# Patient Record
Sex: FEMALE | URBAN - METROPOLITAN AREA
[De-identification: names, ages, dates, MRNs, and addresses within clinical notes are randomized per-mention and may not be internally consistent; named-entity substitution may affect disease eponyms.]

---

## 2021-07-03 ENCOUNTER — TELEPHONE (OUTPATIENT)
Dept: OBGYN CLINIC | Facility: CLINIC | Age: 61
End: 2021-07-03

## 2021-07-03 DIAGNOSIS — N76.0 ACUTE VAGINITIS: Primary | ICD-10-CM

## 2021-07-03 NOTE — TELEPHONE ENCOUNTER
Patient called called on-call line due to suspected yeast infection  Patient states that she has tried over-the-counter Monistat for 3-4 days so far with absolutely no improvement in symptoms  She is requesting vaginal terconazole cream   She reports thick white vaginal discharge with associated vaginal vulvar itching  She does have a history of diabetes  Given symptoms and history, we will treat presumptively with terconazole cream as requested  Patient instructed to call the office on Tuesday to schedule appointment for follow-up in office for exam if symptoms persist or fail to improve  Prescription electronically transmitted to patient's pharmacy of choice      Jackelyn Beal MD  7/3/2021 11:18 AM

## 2023-01-13 PROBLEM — Z01.419 ENCOUNTER FOR GYNECOLOGICAL EXAMINATION (GENERAL) (ROUTINE) WITHOUT ABNORMAL FINDINGS: Status: ACTIVE | Noted: 2023-01-13

## 2023-01-13 PROBLEM — Z90.710 HISTORY OF HYSTERECTOMY: Status: ACTIVE | Noted: 2023-01-13

## 2023-01-13 NOTE — PROGRESS NOTES
Assessment/Plan:    Encounter for gynecological examination (general) (routine) without abnormal findings  All well, no breast or gyn complaints  37 yo daughter just suffered  demise @31 weeks, family grieving  Normal breast and pelvic exams  Last pap 10/2020 neg/HPV neg, due   Mammo order given, last 10/19/2020  Colonoscopy 2007, was due ; referral and # given  Dexa @65, no risk factors       Diagnoses and all orders for this visit:    Encounter for gynecological examination (general) (routine) without abnormal findings    Encounter for screening mammogram for malignant neoplasm of breast  -     Mammo screening bilateral w 3d & cad; Future    Colon cancer screening  -     Ambulatory Referral to Gastroenterology; Future    Candidiasis  -     terconazole (TERAZOL 7) 0 4 % vaginal cream; Insert 1 applicator into the vagina daily at bedtime    Other orders  -     Liquid-based pap, diagnostic  -     FLUoxetine (PROzac) 20 mg capsule  -     Jardiance 25 MG TABS  -     losartan (COZAAR) 25 mg tablet  -     metFORMIN (GLUCOPHAGE) 1000 MG tablet  -     omeprazole (PriLOSEC) 10 mg delayed release capsule  -     pravastatin (PRAVACHOL) 20 mg tablet  -     predniSONE 20 mg tablet  -     Tradjenta 5 MG TABS  -     glimepiride (AMARYL) 4 mg tablet  -     benzonatate (TESSALON PERLES) 100 mg capsule; TAKE 1 CAPSULE BY MOUTH EVERY 8 HOURS AS NEEDED (COUGH)  Subjective:      Patient ID: Alvin Finley is a 58 y o  female  HPI Here for well check last seen 10/2020    The following portions of the patient's history were reviewed and updated as appropriate:   She  has a past medical history of BMI 40 0-44 9, adult ([facility] [address] ), Depression, Diabetes ([facility] [address] ), Factor V Leiden ([facility] [address] ), Gastric reflux, History of pelvic ultrasound (10/06/2020), Hyperlipidemia, and Hypertension    She   Patient Active Problem List    Diagnosis Date Noted   • Encounter for gynecological examination (general) (routine) without abnormal findings 2023   • History of hysterectomy - [facility] 2023     She  has a past surgical history that includes Tubal ligation ();  section; Total abdominal hysterectomy (); Rotator cuff repair (Right, ); Trigger finger release (Left, ); and Colonoscopy ()  Her family history includes Hepatitis in her brother; Lung cancer in her father, mother, sister, and sister; Prostate cancer in her brother; Skin cancer in her sister  She  reports that she has never smoked  She has never used smokeless tobacco  She reports that she does not currently use alcohol  She reports that she does not use drugs  Current Outpatient Medications   Medication Sig Dispense Refill   • benzonatate (TESSALON PERLES) 100 mg capsule TAKE 1 CAPSULE BY MOUTH EVERY 8 HOURS AS NEEDED (COUGH)  • FLUoxetine (PROzac) 20 mg capsule      • glimepiride (AMARYL) 4 mg tablet      • Jardiance 25 MG TABS      • losartan (COZAAR) 25 mg tablet      • metFORMIN (GLUCOPHAGE) 1000 MG tablet      • omeprazole (PriLOSEC) 10 mg delayed release capsule      • pravastatin (PRAVACHOL) 20 mg tablet      • predniSONE 20 mg tablet      • terconazole (TERAZOL 7) 0 4 % vaginal cream Insert 1 applicator into the vagina daily at bedtime 45 g 0   • terconazole (TERAZOL 7) 0 4 % vaginal cream Insert 1 applicator into the vagina daily at bedtime 45 g 1   • Tradjenta 5 MG TABS        No current facility-administered medications for this visit  She is allergic to amoxicillin, clarithromycin, and erythromycin       Review of Systems  No PMB, breast, bladder, bowel changes   No new persistent pain, bloating, early satiety or pelvic pressure      Objective:      /64 (BP Location: Left arm, Patient Position: Sitting, Cuff Size: Standard)   Ht 5' 2" (1 575 m)   Wt 105 kg (231 lb 6 4 oz)   Breastfeeding No   BMI 42 32 kg/m²          Physical Exam    General appearance: no distress, pleasant  Neck: thyroid without nodules or thyromegaly, no palpable adenopathy  Lymph nodes: no palpable adenopathy  Breasts: no masses, nodes or skin changes  Abdomen: soft, non tender, no palpable masses  Pelvic exam:  atrophic external genitalia with mild erythema and induration c/w candidiasis, urethral meatus normal, vagina atrophic without lesions, cervical stump remains, bimanual limited due to habitus,  no adnexal masses, non tender  Rectal exam: normal sphincter tone, no masses, RV confirms above

## 2023-01-17 ENCOUNTER — ANNUAL EXAM (OUTPATIENT)
Dept: OBGYN CLINIC | Facility: CLINIC | Age: 63
End: 2023-01-17

## 2023-01-17 VITALS
BODY MASS INDEX: 42.58 KG/M2 | DIASTOLIC BLOOD PRESSURE: 64 MMHG | HEIGHT: 62 IN | SYSTOLIC BLOOD PRESSURE: 126 MMHG | WEIGHT: 231.4 LBS

## 2023-01-17 DIAGNOSIS — Z12.31 ENCOUNTER FOR SCREENING MAMMOGRAM FOR MALIGNANT NEOPLASM OF BREAST: ICD-10-CM

## 2023-01-17 DIAGNOSIS — Z01.419 ENCOUNTER FOR GYNECOLOGICAL EXAMINATION (GENERAL) (ROUTINE) WITHOUT ABNORMAL FINDINGS: Primary | ICD-10-CM

## 2023-01-17 DIAGNOSIS — B37.9 CANDIDIASIS: ICD-10-CM

## 2023-01-17 DIAGNOSIS — Z12.11 COLON CANCER SCREENING: ICD-10-CM

## 2023-01-17 RX ORDER — BENZONATATE 100 MG/1
CAPSULE ORAL
COMMUNITY
Start: 2022-12-14

## 2023-01-17 RX ORDER — LOSARTAN POTASSIUM 25 MG/1
TABLET ORAL
COMMUNITY
Start: 2023-01-12

## 2023-01-17 RX ORDER — PREDNISONE 20 MG/1
TABLET ORAL
COMMUNITY
Start: 2022-11-30

## 2023-01-17 RX ORDER — LINAGLIPTIN 5 MG/1
TABLET, FILM COATED ORAL
COMMUNITY
Start: 2023-01-13

## 2023-01-17 RX ORDER — EMPAGLIFLOZIN 25 MG/1
TABLET, FILM COATED ORAL
COMMUNITY
Start: 2022-12-26

## 2023-01-17 RX ORDER — PRAVASTATIN SODIUM 20 MG
TABLET ORAL
COMMUNITY
Start: 2022-10-17

## 2023-01-17 RX ORDER — FLUOXETINE HYDROCHLORIDE 20 MG/1
CAPSULE ORAL
COMMUNITY
Start: 2022-12-21

## 2023-01-17 RX ORDER — OMEPRAZOLE 10 MG/1
CAPSULE, DELAYED RELEASE ORAL
COMMUNITY
Start: 2022-11-01

## 2023-01-17 RX ORDER — GLIMEPIRIDE 4 MG/1
TABLET ORAL
COMMUNITY
Start: 2023-01-05

## 2023-01-17 NOTE — PATIENT INSTRUCTIONS
Return to office in one year unless having any problems such as breast changes, bleeding, new persistent pain, new progressive bloating, new problems eating (getting full to quickly) or new constant urinary pressure that does not resolve in one week  Call in six months to schedule your annual visit  [facility]: [phone]  for your colonoscopy      The grief support group is called [facility]

## 2023-01-17 NOTE — ASSESSMENT & PLAN NOTE
All well, no breast or gyn complaints  37 yo daughter just suffered  demise @31 weeks, family grieving  Normal breast and pelvic exams    Last pap 10/2020 neg/HPV neg, due   Mammo order given, last 10/19/2020  Colonoscopy , was due ; referral and # given  Dexa @65, no risk factors

## 2023-01-17 NOTE — LETTER
2023     [address] [facility] [id number]    Patient: Jacob Daly   YOB: 1960   Date of Visit: 2023       Dear Dr Ardean Gitelman: Thank you for referring Frederick Dailey to me for evaluation  Below are my notes for this consultation  If you have questions, please do not hesitate to call me  I look forward to following your patient along with you  Sincerely,        Dorita Lozano MD        CC: No Recipients  Dorita Lozano MD  2023 11:29 AM  Sign when Signing Visit  Assessment/Plan:    Encounter for gynecological examination (general) (routine) without abnormal findings  All well, no breast or gyn complaints  35 yo daughter just suffered  demise @31 weeks, family grieving  Normal breast and pelvic exams  Last pap 10/2020 neg/HPV neg, due   Mammo order given, last 10/19/2020  Colonoscopy , was due ; referral and # given  Dexa @65, no risk factors      Diagnoses and all orders for this visit:    Encounter for gynecological examination (general) (routine) without abnormal findings    Encounter for screening mammogram for malignant neoplasm of breast  -     Mammo screening bilateral w 3d & cad; Future    Colon cancer screening  -     Ambulatory Referral to Gastroenterology; Future    Candidiasis  -     terconazole (TERAZOL 7) 0 4 % vaginal cream; Insert 1 applicator into the vagina daily at bedtime    Other orders  -     Liquid-based pap, diagnostic  -     FLUoxetine (PROzac) 20 mg capsule  -     Jardiance 25 MG TABS  -     losartan (COZAAR) 25 mg tablet  -     metFORMIN (GLUCOPHAGE) 1000 MG tablet  -     omeprazole (PriLOSEC) 10 mg delayed release capsule  -     pravastatin (PRAVACHOL) 20 mg tablet  -     predniSONE 20 mg tablet  -     Tradjenta 5 MG TABS  -     glimepiride (AMARYL) 4 mg tablet  -     benzonatate (TESSALON PERLES) 100 mg capsule; TAKE 1 CAPSULE BY MOUTH EVERY 8 HOURS AS NEEDED (COUGH)           Subjective: Patient ID: Lela Mendez is a 58 y o  female  HPI Here for well check last seen 10/2020    The following portions of the patient's history were reviewed and updated as appropriate:   She  has a past medical history of BMI 40 0-44 9, adult ([facility] [address] ), Depression, Diabetes ([facility] [address] ), Factor V Leiden ([facility] [address] ), Gastric reflux, History of pelvic ultrasound (10/06/2020), Hyperlipidemia, and Hypertension  She   Patient Active Problem List    Diagnosis Date Noted   • Encounter for gynecological examination (general) (routine) without abnormal findings 2023   • History of hysterectomy - [facility] 2023     She  has a past surgical history that includes Tubal ligation ();  section; Total abdominal hysterectomy (); Rotator cuff repair (Right, ); Trigger finger release (Left, ); and Colonoscopy ()  Her family history includes Hepatitis in her brother; Lung cancer in her father, mother, sister, and sister; Prostate cancer in her brother; Skin cancer in her sister  She  reports that she has never smoked  She has never used smokeless tobacco  She reports that she does not currently use alcohol  She reports that she does not use drugs  Current Outpatient Medications   Medication Sig Dispense Refill   • benzonatate (TESSALON PERLES) 100 mg capsule TAKE 1 CAPSULE BY MOUTH EVERY 8 HOURS AS NEEDED (COUGH)       • FLUoxetine (PROzac) 20 mg capsule      • glimepiride (AMARYL) 4 mg tablet      • Jardiance 25 MG TABS      • losartan (COZAAR) 25 mg tablet      • metFORMIN (GLUCOPHAGE) 1000 MG tablet      • omeprazole (PriLOSEC) 10 mg delayed release capsule      • pravastatin (PRAVACHOL) 20 mg tablet      • predniSONE 20 mg tablet      • terconazole (TERAZOL 7) 0 4 % vaginal cream Insert 1 applicator into the vagina daily at bedtime 45 g 0   • terconazole (TERAZOL 7) 0 4 % vaginal cream Insert 1 applicator into the vagina daily at bedtime 45 g 1   • Tradjenta 5 MG TABS        No current facility-administered medications for this visit  She is allergic to amoxicillin, clarithromycin, and erythromycin       Review of Systems No PMB, breast, bladder, bowel changes   No new persistent pain, bloating, early satiety or pelvic pressure      Objective:      /64 (BP Location: Left arm, Patient Position: Sitting, Cuff Size: Standard)   Ht 5' 2" (1 575 m)   Wt 105 kg (231 lb 6 4 oz)   Breastfeeding No   BMI 42 32 kg/m²         Physical Exam   General appearance: no distress, pleasant  Neck: thyroid without nodules or thyromegaly, no palpable adenopathy  Lymph nodes: no palpable adenopathy  Breasts: no masses, nodes or skin changes  Abdomen: soft, non tender, no palpable masses  Pelvic exam:  atrophic external genitalia with mild erythema and induration c/w candidiasis, urethral meatus normal, vagina atrophic without lesions, cervical stump remains, bimanual limited due to habitus,  no adnexal masses, non tender  Rectal exam: normal sphincter tone, no masses, RV confirms above

## 2024-02-21 PROBLEM — Z01.419 ENCOUNTER FOR GYNECOLOGICAL EXAMINATION (GENERAL) (ROUTINE) WITHOUT ABNORMAL FINDINGS: Status: RESOLVED | Noted: 2023-01-13 | Resolved: 2024-02-21

## 2024-06-13 ENCOUNTER — PREP FOR PROCEDURE (OUTPATIENT)
Age: 64
End: 2024-06-13

## 2024-06-13 ENCOUNTER — TELEPHONE (OUTPATIENT)
Age: 64
End: 2024-06-13

## 2024-06-13 DIAGNOSIS — Z12.11 SCREENING FOR COLON CANCER: Primary | ICD-10-CM

## 2024-06-13 NOTE — TELEPHONE ENCOUNTER
24  Screened by: Maggi Bush    Referring Provider Dr Ramirez    Pre- Screenin' 2 204 BMI 37.3    Has patient been referred for a routine screening Colonoscopy? yes  Is the patient between 45-75 years old? yes      Previous Colonoscopy yes   If yes:    Date: 17 years     Facility:     Reason:         Does the patient want to see a Gastroenterologist prior to their procedure OR are they having any GI symptoms? yes constipation    Has the patient been hospitalized or had abdominal surgery in the past 6 months? no    Does the patient use supplemental oxygen? no    Does the patient take Coumadin, Lovenox, Plavix, Elliquis, Xarelto, or other blood thinning medication? no    Has the patient had a stroke, cardiac event, or stent placed in the past year? no    If patient is between 45yrs - 49yrs, please advise patient that we will have to confirm benefits & coverage with their insurance company for a routine screening colonoscopy.

## 2024-06-13 NOTE — TELEPHONE ENCOUNTER
Scheduled date of colonoscopy (as of today):7/3/24    Physician performing colonoscopy:Dr Flanagan    Location of colonoscopy:[facility]    Bowel prep reviewed with patient: miralax/dulcolax    Instructions reviewed with patient by:prep instructions sent via email on chart    Clearances: N/A

## 2024-06-14 ENCOUNTER — TELEPHONE (OUTPATIENT)
Age: 64
End: 2024-06-14

## 2024-06-14 NOTE — TELEPHONE ENCOUNTER
Patient calling to reschedule her colonoscopy. Colonoscopy has been rescheduled for 7/25/24 with  at [facility]. Patient has prep instructions.

## 2024-07-15 ENCOUNTER — TELEPHONE (OUTPATIENT)
Dept: GASTROENTEROLOGY | Facility: CLINIC | Age: 64
End: 2024-07-15

## 2024-07-15 NOTE — TELEPHONE ENCOUNTER
Procedure confirmed  Colonoscopy     Via: Spoke with patient.      Instructions given: Email     Prep Given: Miralax/Dulcolax    Call the office if there are any questions.      Confirmed with pt Jardiance hold 4 days prior

## 2024-07-25 ENCOUNTER — HOSPITAL ENCOUNTER (OUTPATIENT)
Dept: GASTROENTEROLOGY | Facility: HOSPITAL | Age: 64
Setting detail: OUTPATIENT SURGERY
End: 2024-07-25
Attending: STUDENT IN AN ORGANIZED HEALTH CARE EDUCATION/TRAINING PROGRAM
Payer: COMMERCIAL

## 2024-07-25 ENCOUNTER — ANESTHESIA EVENT (OUTPATIENT)
Dept: GASTROENTEROLOGY | Facility: HOSPITAL | Age: 64
End: 2024-07-25

## 2024-07-25 ENCOUNTER — ANESTHESIA (OUTPATIENT)
Dept: GASTROENTEROLOGY | Facility: HOSPITAL | Age: 64
End: 2024-07-25

## 2024-07-25 VITALS
WEIGHT: 200 LBS | RESPIRATION RATE: 16 BRPM | HEIGHT: 62 IN | TEMPERATURE: 98.1 F | HEART RATE: 61 BPM | BODY MASS INDEX: 36.8 KG/M2 | SYSTOLIC BLOOD PRESSURE: 126 MMHG | OXYGEN SATURATION: 95 % | DIASTOLIC BLOOD PRESSURE: 59 MMHG

## 2024-07-25 DIAGNOSIS — Z12.11 SCREENING FOR COLON CANCER: ICD-10-CM

## 2024-07-25 LAB — GLUCOSE SERPL-MCNC: 104 MG/DL (ref 65–140)

## 2024-07-25 PROCEDURE — 45380 COLONOSCOPY AND BIOPSY: CPT | Performed by: INTERNAL MEDICINE

## 2024-07-25 PROCEDURE — 82948 REAGENT STRIP/BLOOD GLUCOSE: CPT

## 2024-07-25 PROCEDURE — 88305 TISSUE EXAM BY PATHOLOGIST: CPT | Performed by: PATHOLOGY

## 2024-07-25 RX ORDER — SODIUM CHLORIDE 9 MG/ML
INJECTION, SOLUTION INTRAVENOUS CONTINUOUS PRN
Status: DISCONTINUED | OUTPATIENT
Start: 2024-07-25 | End: 2024-07-25

## 2024-07-25 RX ORDER — PROPOFOL 10 MG/ML
INJECTION, EMULSION INTRAVENOUS AS NEEDED
Status: DISCONTINUED | OUTPATIENT
Start: 2024-07-25 | End: 2024-07-25

## 2024-07-25 RX ADMIN — PROPOFOL 50 MG: 10 INJECTION, EMULSION INTRAVENOUS at 08:13

## 2024-07-25 RX ADMIN — PROPOFOL 100 MG: 10 INJECTION, EMULSION INTRAVENOUS at 08:00

## 2024-07-25 RX ADMIN — PROPOFOL 50 MG: 10 INJECTION, EMULSION INTRAVENOUS at 08:02

## 2024-07-25 RX ADMIN — SODIUM CHLORIDE: 0.9 INJECTION, SOLUTION INTRAVENOUS at 07:58

## 2024-07-25 RX ADMIN — PROPOFOL 50 MG: 10 INJECTION, EMULSION INTRAVENOUS at 08:09

## 2024-07-25 RX ADMIN — PROPOFOL 50 MG: 10 INJECTION, EMULSION INTRAVENOUS at 08:05

## 2024-07-25 NOTE — ANESTHESIA POSTPROCEDURE EVALUATION
Post-Op Assessment Note    CV Status:  Stable  Pain Score: 0    Pain management: adequate       Mental Status:  Alert and awake   Hydration Status:  Stable   PONV Controlled:  None   Airway Patency:  Patent     Post Op Vitals Reviewed: Yes    No anethesia notable event occurred.    Staff: CRNA               /54 (07/25/24 0820)    Temp      Pulse 69 (07/25/24 0820)   Resp (!) 10 (07/25/24 0820)    SpO2 98 % (07/25/24 0820)

## 2024-07-25 NOTE — ANESTHESIA PREPROCEDURE EVALUATION
Procedure:  COLONOSCOPY    Relevant Problems   GYN   (+) History of hysterectomy - [facility]      BMI 40.0-44.9, adult (HCC)  Hyperlipidemia    Gastric reflux  Diabetes (HCC)    Depression  Hypertension    History of pelvic ultrasound s/p hysterectomy, left ovary 2.2 cm, WNL, no adnexal masses, right ovary nonvisualized Factor V Leiden (HCC)        Physical Exam    Airway    Mallampati score: II  TM Distance: >3 FB  Neck ROM: full     Dental       Cardiovascular      Pulmonary      Other Findings  post-pubertal.      Anesthesia Plan  ASA Score- 2     Anesthesia Type- IV sedation with anesthesia with ASA Monitors.         Additional Monitors:     Airway Plan:            Plan Factors-    Chart reviewed.                      Induction- intravenous.    Postoperative Plan-         Informed Consent- Anesthetic plan and risks discussed with patient.  I personally reviewed this patient with the CRNA. Discussed and agreed on the Anesthesia Plan with the CRNA..

## 2024-07-25 NOTE — H&P
History and Physical - [facility]  Kelly Norwood 64 y.o. female MRN: [id number]    HPI: Kelly Norwood is a 64 y.o. year old female who presents for colon cancer screening    REVIEW OF SYSTEMS: Per the HPI, and otherwise unremarkable.    Historical Information   Past Medical History:   Diagnosis Date    BMI 40.0-44.9, adult (HCC)     Depression     Diabetes (HCC)     Factor V Leiden (HCC)     Gastric reflux     History of pelvic ultrasound 10/06/2020    s/p hysterectomy, left ovary 2.2 cm, WNL, no adnexal masses, right ovary nonvisualized    Hyperlipidemia     Hypertension      Past Surgical History:   Procedure Laterality Date     SECTION      1982, 84, 86    COLONOSCOPY      was due 2017    ROTATOR CUFF REPAIR Right 2014    TOTAL ABDOMINAL HYSTERECTOMY      TRIGGER FINGER RELEASE Left 2017    TUBAL LIGATION       Social History   Social History     Substance and Sexual Activity   Alcohol Use Not Currently     Social History     Substance and Sexual Activity   Drug Use Never     Social History     Tobacco Use   Smoking Status Never   Smokeless Tobacco Never     Family History   Problem Relation Age of Onset    Lung cancer Mother     Lung cancer Father     Lung cancer Sister     Skin cancer Sister     Lung cancer Sister     Prostate cancer Brother     Hepatitis Brother     Breast cancer Neg Hx     Uterine cancer Neg Hx     Ovarian cancer Neg Hx     Colon cancer Neg Hx     Heart attack Neg Hx         no early CVD       Meds/Allergies       Current Outpatient Medications:     FLUoxetine (PROzac) 20 mg capsule    glimepiride (AMARYL) 4 mg tablet    Jardiance 25 MG TABS    metFORMIN (GLUCOPHAGE) 1000 MG tablet    omeprazole (PriLOSEC) 10 mg delayed release capsule    Tradjenta 5 MG TABS    benzonatate (TESSALON PERLES) 100 mg capsule    losartan (COZAAR) 25 mg tablet    pravastatin (PRAVACHOL) 20 mg tablet    predniSONE 20 mg tablet    terconazole (TERAZOL 7) 0.4 % vaginal  "cream    terconazole (TERAZOL 7) 0.4 % vaginal cream    Allergies   Allergen Reactions    Amoxicillin Swelling    Clarithromycin Hives    Erythromycin Hives       Objective     /60   Pulse 69   Temp 98.1 °F (36.7 °C) (Temporal)   Resp 18   Ht 5' 2\" (1.575 m)   Wt 90.7 kg (200 lb)   SpO2 98%   BMI 36.58 kg/m²     PHYSICAL EXAM    Gen: NAD AAOx3  Head: Normocephalic, Atraumatic  CV: S1S2 RRR no m/r/g  CHEST: Clear b/l no c/r/w  ABD: soft, +BS NT/ND  EXT: no edema    ASSESSMENT/PLAN:  This is a 64 y.o. year old female here for colonoscopy, and she is stable and optimized for her procedure.        "

## 2024-07-30 PROCEDURE — 88305 TISSUE EXAM BY PATHOLOGIST: CPT | Performed by: PATHOLOGY
